# Patient Record
Sex: FEMALE | Race: WHITE | URBAN - METROPOLITAN AREA
[De-identification: names, ages, dates, MRNs, and addresses within clinical notes are randomized per-mention and may not be internally consistent; named-entity substitution may affect disease eponyms.]

---

## 2022-03-19 PROBLEM — N91.4 SECONDARY OLIGOMENORRHEA: Status: ACTIVE | Noted: 2020-08-12

## 2022-03-19 PROBLEM — E06.3 HASHIMOTO'S THYROIDITIS: Status: ACTIVE | Noted: 2021-09-28

## 2022-05-30 DIAGNOSIS — E03.9 PRIMARY HYPOTHYROIDISM: Primary | ICD-10-CM

## 2022-08-09 ENCOUNTER — OFFICE VISIT (OUTPATIENT)
Dept: ENDOCRINOLOGY | Age: 22
End: 2022-08-09
Payer: COMMERCIAL

## 2022-08-09 VITALS
HEART RATE: 84 BPM | SYSTOLIC BLOOD PRESSURE: 112 MMHG | HEIGHT: 68 IN | BODY MASS INDEX: 25.76 KG/M2 | DIASTOLIC BLOOD PRESSURE: 64 MMHG | OXYGEN SATURATION: 100 % | WEIGHT: 170 LBS

## 2022-08-09 DIAGNOSIS — E06.3 HASHIMOTO'S THYROIDITIS: ICD-10-CM

## 2022-08-09 DIAGNOSIS — E03.9 PRIMARY HYPOTHYROIDISM: Primary | ICD-10-CM

## 2022-08-09 PROCEDURE — 99213 OFFICE O/P EST LOW 20 MIN: CPT | Performed by: INTERNAL MEDICINE

## 2022-08-09 RX ORDER — LEVOTHYROXINE SODIUM 0.07 MG/1
75 TABLET ORAL DAILY
Qty: 30 TABLET | Refills: 11 | Status: SHIPPED | OUTPATIENT
Start: 2022-08-09

## 2022-08-09 ASSESSMENT — ENCOUNTER SYMPTOMS
DIARRHEA: 0
ROS SKIN COMMENTS: DENIES ABNORMAL HAIR LOSS, DRY SKIN.
CONSTIPATION: 0

## 2022-08-09 NOTE — PROGRESS NOTES
Diego Ng MD, [facility]  [address], [address] [address], [address]  Phone [phone]          Devante Clarke is a 20 y.o. female seen 8/9/2022 for follow-up of hypothyroidism        ASSESSMENT AND PLAN:    1. Primary hypothyroidism  Her thyroid function tests have been fluctuating requiring frequent dosage adjustments. She was over replaced on levothyroxine 100 mcg daily and severely under replaced on levothyroxine 25 mcg daily. She is currently under replaced and I will increase her levothyroxine as below to target a TSH of 0.4-2.5. Follow up in 4 months. - levothyroxine (SYNTHROID) 75 MCG tablet; Take 1 tablet by mouth in the morning. Dispense: 30 tablet; Refill: 11    2. Hashimoto's thyroiditis  Based on her positive family history of thyroid disease and positive thyroid peroxidase antibodies, she has Hashimoto's thyroiditis. Follow-up and Dispositions    Return in about 4 months (around 12/9/2022). HISTORY OF PRESENT ILLNESS:    THYROID DISEASE     Presentation/Diagnosis: Hypothyroidism diagnosed in 2015. Symptoms: See review of systems. Treatment: Takes generic in AM correctly. Imaging: None. Labs:  9/22/2015: TSH 6.890, total T4 7.9.  8/10/2020: TSH 7.060, free T4 1.18, total testosterone 28, free testosterone 2.2, DHEA-S.86, prolactin 6.3.  9/15/2020: TSH 4.330.  10/20/2020: TSH 5.200, total T4 12.6.  11/10/2020: TSH 4.470.  12/1/2020: TSH 0.260.  1/2/2021: TSH 0.070 (on levothyroxine 100 mcg daily). 1/29/2021: TSH 0.209, free T4 1.50 (on levothyroxine 50 mcg daily). 3/19/2021: .000, free T4 0.36 (on levothyroxine 25 mcg daily). 5/20/2021: TSH 5.250, free T4 1.27, thyroid peroxidase antibodies 361.  9/24/2021: TSH 5.010, free T4 1.28.  2/18/2022: TSH 5.530, free T4 1.28 (on levothyroxine 57 mcg daily).   8/5/2022: TSH 5.110, free T4 1.24 (on levothyroxine 64 mcg daily). Pregnancy status: No pregnancy plans. Review of Systems   Constitutional:  Negative for diaphoresis and fatigue. Weight increased 8 pounds since last visit. Cardiovascular:  Negative for palpitations. Gastrointestinal:  Negative for constipation and diarrhea. Endocrine: Negative for cold intolerance and heat intolerance. Genitourinary:  Negative for menstrual problem. Skin:         Denies abnormal hair loss, dry skin. Neurological:  Negative for tremors. Vital Signs:  /64   Pulse 84   Ht 5' 8\" (1.727 m)   Wt 170 lb (77.1 kg)   SpO2 100%   BMI 25.85 kg/m²     Wt Readings from Last 3 Encounters:   08/09/22 170 lb (77.1 kg)   02/23/22 162 lb (73.5 kg)   09/28/21 157 lb (71.2 kg)       Physical Exam  Constitutional:       General: She is not in acute distress. Neck:      Thyroid: No thyroid mass or thyromegaly. Cardiovascular:      Rate and Rhythm: Normal rate and regular rhythm. Lymphadenopathy:      Cervical: No cervical adenopathy. Neurological:      Motor: No tremor. Orders Placed This Encounter   Procedures    TSH with Reflex     Standing Status:   Future     Standing Expiration Date:   8/9/2023         Current Outpatient Medications   Medication Sig Dispense Refill    levothyroxine (SYNTHROID) 75 MCG tablet Take 1 tablet by mouth in the morning. 30 tablet 11    Multiple Vitamin (MULTIVITAMIN PO) Take by mouth      medroxyPROGESTERone (PROVERA) 10 MG tablet Take 10 mg by mouth daily (Patient not taking: Reported on 8/9/2022)       No current facility-administered medications for this visit.

## 2023-02-07 ENCOUNTER — OFFICE VISIT (OUTPATIENT)
Dept: ENDOCRINOLOGY | Age: 23
End: 2023-02-07
Payer: COMMERCIAL

## 2023-02-07 VITALS
BODY MASS INDEX: 24.33 KG/M2 | DIASTOLIC BLOOD PRESSURE: 80 MMHG | HEART RATE: 98 BPM | WEIGHT: 160 LBS | OXYGEN SATURATION: 98 % | SYSTOLIC BLOOD PRESSURE: 118 MMHG

## 2023-02-07 DIAGNOSIS — E06.3 HASHIMOTO'S THYROIDITIS: ICD-10-CM

## 2023-02-07 DIAGNOSIS — E03.9 PRIMARY HYPOTHYROIDISM: Primary | ICD-10-CM

## 2023-02-07 LAB — TSH SERPL DL<=0.005 MIU/L-ACNC: 4.48 UIU/ML (ref 0.45–4.5)

## 2023-02-07 PROCEDURE — 99213 OFFICE O/P EST LOW 20 MIN: CPT | Performed by: INTERNAL MEDICINE

## 2023-02-07 RX ORDER — LEVOTHYROXINE SODIUM 88 UG/1
88 TABLET ORAL DAILY
Qty: 30 TABLET | Refills: 11 | Status: SHIPPED | OUTPATIENT
Start: 2023-02-07

## 2023-02-07 ASSESSMENT — ENCOUNTER SYMPTOMS
ROS SKIN COMMENTS: DENIES ABNORMAL HAIR LOSS, DRY SKIN.
DIARRHEA: 0
CONSTIPATION: 0

## 2023-02-07 NOTE — PROGRESS NOTES
Diego Felipe MD, [facility]  [address], [address] [facility], [address] [place] [address]  Phone [phone]          Nancy Butterfield is a 20 y.o. female seen 2/7/2023 for follow-up of hypothyroidism        ASSESSMENT AND PLAN:    1. Primary hypothyroidism  Her thyroid function tests have been fluctuating requiring frequent dosage adjustments. She was over replaced on levothyroxine 100 mcg daily and severely under replaced on levothyroxine 25 mcg daily. I will increase her levothyroxine as below to target a TSH of 0.4-2.5. Follow up in 4 months. - levothyroxine (SYNTHROID) 88 MCG tablet; Take 1 tablet by mouth in the morning. Dispense: 30 tablet; Refill: 11    2. Hashimoto's thyroiditis  Based on her positive family history of thyroid disease and positive thyroid peroxidase antibodies, she has Hashimoto's thyroiditis. Follow-up and Dispositions    Return in about 4 months (around 6/7/2023). HISTORY OF PRESENT ILLNESS:    THYROID DISEASE     Presentation/Diagnosis: Hypothyroidism diagnosed in 2015. Symptoms: See review of systems. Treatment: Takes generic in AM correctly. Imaging: None. Labs:  9/22/2015: TSH 6.890, total T4 7.9.  8/10/2020: TSH 7.060, free T4 1.18, total testosterone 28, free testosterone 2.2, DHEA-S.86, prolactin 6.3.  9/15/2020: TSH 4.330.  10/20/2020: TSH 5.200, total T4 12.6.  11/10/2020: TSH 4.470.  12/1/2020: TSH 0.260.  1/2/2021: TSH 0.070 (on levothyroxine 100 mcg daily). 1/29/2021: TSH 0.209, free T4 1.50 (on levothyroxine 50 mcg daily). 3/19/2021: .000, free T4 0.36 (on levothyroxine 25 mcg daily). 5/20/2021: TSH 5.250, free T4 1.27, thyroid peroxidase antibodies 361.  9/24/2021: TSH 5.010, free T4 1.28.  2/18/2022: TSH 5.530, free T4 1.28 (on levothyroxine 57 mcg daily). 8/5/2022: TSH 5.110, free T4 1.24 (on levothyroxine 64 mcg daily).   2/7/2023: TSH 4.480 (on levothyroxine 75 mcg daily). Pregnancy status: No pregnancy plans. Review of Systems   Constitutional:  Negative for diaphoresis and fatigue. Weight decreased 10 pounds since last visit. Cardiovascular:  Negative for palpitations. Gastrointestinal:  Negative for constipation and diarrhea. Endocrine: Negative for cold intolerance and heat intolerance. Genitourinary:  Positive for menstrual problem (mildly irregular). Skin:         Denies abnormal hair loss, dry skin. Neurological:  Negative for tremors. Vital Signs:  /80   Pulse 98   Wt 160 lb (72.6 kg)   SpO2 98%   BMI 24.33 kg/m²     Wt Readings from Last 3 Encounters:   02/07/23 160 lb (72.6 kg)   08/09/22 170 lb (77.1 kg)   02/23/22 162 lb (73.5 kg)       Physical Exam  Constitutional:       General: She is not in acute distress. Neck:      Thyroid: No thyroid mass or thyromegaly. Cardiovascular:      Rate and Rhythm: Normal rate and regular rhythm. Lymphadenopathy:      Cervical: No cervical adenopathy. Neurological:      Motor: No tremor. Orders Placed This Encounter   Procedures    TSH with Reflex     Standing Status:   Future     Standing Expiration Date:   2/7/2024           Current Outpatient Medications   Medication Sig Dispense Refill    levothyroxine (SYNTHROID) 88 MCG tablet Take 1 tablet by mouth Daily 30 tablet 11    Multiple Vitamin (MULTIVITAMIN PO) Take by mouth      medroxyPROGESTERone (PROVERA) 10 MG tablet Take 10 mg by mouth daily       No current facility-administered medications for this visit.

## 2023-05-04 ENCOUNTER — TELEPHONE (OUTPATIENT)
Dept: OBGYN CLINIC | Age: 23
End: 2023-05-04

## 2023-06-10 LAB
T4 FREE SERPL-MCNC: 1.88 NG/DL (ref 0.82–1.77)
TSH SERPL DL<=0.005 MIU/L-ACNC: 1.1 UIU/ML (ref 0.45–4.5)

## 2023-06-13 PROBLEM — R09.89 GLOBUS SENSATION: Status: ACTIVE | Noted: 2023-06-13

## 2023-07-19 ENCOUNTER — TELEPHONE (OUTPATIENT)
Dept: ENDOCRINOLOGY | Age: 23
End: 2023-07-19

## 2023-07-19 NOTE — TELEPHONE ENCOUNTER
Patient called today requesting a refill on her medroxyProgesterone. I see that it was discontinued on her medication list. Would you like for her to take this medication? If so please send to the [facility] pharmacy in [facility].

## 2023-07-19 NOTE — TELEPHONE ENCOUNTER
I don't prescribe that medication for her. She will need to call the provider that originally prescribed it.

## 2023-07-20 ENCOUNTER — TELEPHONE (OUTPATIENT)
Dept: OBGYN CLINIC | Age: 23
End: 2023-07-20

## 2023-07-20 NOTE — TELEPHONE ENCOUNTER
Pt called in today needing a refill/re-Rx of her medroxyprogesterone (PROVERA) 10 MG tablet. It was originally prescribed by BRENDAN on 11/10/2020 and her last appt with Dr. Deann Steward was 1/29/2021. Her PCP advised her to reach out to us for this script. She has a new OB and can not get in with them until mid August. States she urgently need's this medication. Please advise, thank you. Pharmacy: [id number]